# Patient Record
Sex: FEMALE | Employment: OTHER | URBAN - METROPOLITAN AREA
[De-identification: names, ages, dates, MRNs, and addresses within clinical notes are randomized per-mention and may not be internally consistent; named-entity substitution may affect disease eponyms.]

---

## 2023-06-16 LAB
ALANINE AMINOTRANSFERASE (SGPT) (U/L) IN SER/PLAS: 16 U/L (ref 7–45)
ANTI-DNA (DS): 1 IU/ML
ASPARTATE AMINOTRANSFERASE (SGOT) (U/L) IN SER/PLAS: 24 U/L (ref 9–39)
BASOPHILS (10*3/UL) IN BLOOD BY AUTOMATED COUNT: 0.02 X10E9/L (ref 0–0.1)
BASOPHILS/100 LEUKOCYTES IN BLOOD BY AUTOMATED COUNT: 0.4 % (ref 0–2)
C REACTIVE PROTEIN (MG/L) IN SER/PLAS: 0.26 MG/DL
COMPLEMENT C3 (MG/DL) IN SER/PLAS: 136 MG/DL (ref 87–200)
COMPLEMENT C4 (MG/DL) IN SER/PLAS: 37 MG/DL (ref 10–50)
CREATININE (MG/DL) IN SER/PLAS: 0.85 MG/DL (ref 0.5–1.05)
EOSINOPHILS (10*3/UL) IN BLOOD BY AUTOMATED COUNT: 0.09 X10E9/L (ref 0–0.4)
EOSINOPHILS/100 LEUKOCYTES IN BLOOD BY AUTOMATED COUNT: 1.8 % (ref 0–6)
ERYTHROCYTE DISTRIBUTION WIDTH (RATIO) BY AUTOMATED COUNT: 12.6 % (ref 11.5–14.5)
ERYTHROCYTE MEAN CORPUSCULAR HEMOGLOBIN CONCENTRATION (G/DL) BY AUTOMATED: 31.4 G/DL (ref 32–36)
ERYTHROCYTE MEAN CORPUSCULAR VOLUME (FL) BY AUTOMATED COUNT: 94 FL (ref 80–100)
ERYTHROCYTES (10*6/UL) IN BLOOD BY AUTOMATED COUNT: 4.63 X10E12/L (ref 4–5.2)
GFR FEMALE: 69 ML/MIN/1.73M2
HEMATOCRIT (%) IN BLOOD BY AUTOMATED COUNT: 43.6 % (ref 36–46)
HEMOGLOBIN (G/DL) IN BLOOD: 13.7 G/DL (ref 12–16)
IMMATURE GRANULOCYTES/100 LEUKOCYTES IN BLOOD BY AUTOMATED COUNT: 0.4 % (ref 0–0.9)
LEUKOCYTES (10*3/UL) IN BLOOD BY AUTOMATED COUNT: 5.1 X10E9/L (ref 4.4–11.3)
LYMPHOCYTES (10*3/UL) IN BLOOD BY AUTOMATED COUNT: 0.86 X10E9/L (ref 0.8–3)
LYMPHOCYTES/100 LEUKOCYTES IN BLOOD BY AUTOMATED COUNT: 16.9 % (ref 13–44)
MONOCYTES (10*3/UL) IN BLOOD BY AUTOMATED COUNT: 0.35 X10E9/L (ref 0.05–0.8)
MONOCYTES/100 LEUKOCYTES IN BLOOD BY AUTOMATED COUNT: 6.9 % (ref 2–10)
NEUTROPHILS (10*3/UL) IN BLOOD BY AUTOMATED COUNT: 3.74 X10E9/L (ref 1.6–5.5)
NEUTROPHILS/100 LEUKOCYTES IN BLOOD BY AUTOMATED COUNT: 73.6 % (ref 40–80)
NRBC (PER 100 WBCS) BY AUTOMATED COUNT: 0 /100 WBC (ref 0–0)
PLATELETS (10*3/UL) IN BLOOD AUTOMATED COUNT: 223 X10E9/L (ref 150–450)
SEDIMENTATION RATE, ERYTHROCYTE: 3 MM/H (ref 0–30)
UREA NITROGEN (MG/DL) IN SER/PLAS: 15 MG/DL (ref 6–23)

## 2023-12-15 ENCOUNTER — LAB (OUTPATIENT)
Dept: LAB | Facility: LAB | Age: 83
End: 2023-12-15
Payer: MEDICARE

## 2023-12-15 ENCOUNTER — OFFICE VISIT (OUTPATIENT)
Dept: RHEUMATOLOGY | Facility: CLINIC | Age: 83
End: 2023-12-15
Payer: MEDICARE

## 2023-12-15 VITALS
HEIGHT: 63 IN | TEMPERATURE: 97 F | WEIGHT: 145.8 LBS | BODY MASS INDEX: 25.83 KG/M2 | SYSTOLIC BLOOD PRESSURE: 152 MMHG | HEART RATE: 86 BPM | DIASTOLIC BLOOD PRESSURE: 86 MMHG

## 2023-12-15 DIAGNOSIS — Z79.899 ENCOUNTER FOR MONITORING OF HYDROXYCHLOROQUINE THERAPY: ICD-10-CM

## 2023-12-15 DIAGNOSIS — Z51.81 ENCOUNTER FOR MONITORING OF HYDROXYCHLOROQUINE THERAPY: ICD-10-CM

## 2023-12-15 DIAGNOSIS — M32.9 SYSTEMIC LUPUS ERYTHEMATOSUS, UNSPECIFIED SLE TYPE, UNSPECIFIED ORGAN INVOLVEMENT STATUS (MULTI): ICD-10-CM

## 2023-12-15 DIAGNOSIS — M32.9 SYSTEMIC LUPUS ERYTHEMATOSUS, UNSPECIFIED SLE TYPE, UNSPECIFIED ORGAN INVOLVEMENT STATUS (MULTI): Primary | ICD-10-CM

## 2023-12-15 PROBLEM — I10 HYPERTENSION: Status: ACTIVE | Noted: 2023-12-15

## 2023-12-15 PROBLEM — H40.003 GLAUCOMA SUSPECT OF BOTH EYES: Status: ACTIVE | Noted: 2023-12-15

## 2023-12-15 PROCEDURE — 3077F SYST BP >= 140 MM HG: CPT | Performed by: INTERNAL MEDICINE

## 2023-12-15 PROCEDURE — 99214 OFFICE O/P EST MOD 30 MIN: CPT | Performed by: INTERNAL MEDICINE

## 2023-12-15 PROCEDURE — 1126F AMNT PAIN NOTED NONE PRSNT: CPT | Performed by: INTERNAL MEDICINE

## 2023-12-15 PROCEDURE — 3079F DIAST BP 80-89 MM HG: CPT | Performed by: INTERNAL MEDICINE

## 2023-12-15 PROCEDURE — 86140 C-REACTIVE PROTEIN: CPT

## 2023-12-15 PROCEDURE — 86160 COMPLEMENT ANTIGEN: CPT

## 2023-12-15 PROCEDURE — 80053 COMPREHEN METABOLIC PANEL: CPT

## 2023-12-15 PROCEDURE — 86225 DNA ANTIBODY NATIVE: CPT

## 2023-12-15 PROCEDURE — 85652 RBC SED RATE AUTOMATED: CPT

## 2023-12-15 PROCEDURE — 1036F TOBACCO NON-USER: CPT | Performed by: INTERNAL MEDICINE

## 2023-12-15 PROCEDURE — 1160F RVW MEDS BY RX/DR IN RCRD: CPT | Performed by: INTERNAL MEDICINE

## 2023-12-15 PROCEDURE — 36415 COLL VENOUS BLD VENIPUNCTURE: CPT

## 2023-12-15 PROCEDURE — 1159F MED LIST DOCD IN RCRD: CPT | Performed by: INTERNAL MEDICINE

## 2023-12-15 PROCEDURE — 85025 COMPLETE CBC W/AUTO DIFF WBC: CPT

## 2023-12-15 RX ORDER — CHOLECALCIFEROL (VITAMIN D3) 25 MCG
1 TABLET ORAL DAILY
COMMUNITY
Start: 2017-12-06

## 2023-12-15 RX ORDER — LATANOPROST 50 UG/ML
SOLUTION/ DROPS OPHTHALMIC
COMMUNITY
Start: 2014-03-25

## 2023-12-15 RX ORDER — HYDROCHLOROTHIAZIDE 25 MG/1
0.5 TABLET ORAL DAILY
COMMUNITY

## 2023-12-15 RX ORDER — CYCLOSPORINE 0.5 MG/ML
1 EMULSION OPHTHALMIC EVERY 12 HOURS
COMMUNITY

## 2023-12-15 RX ORDER — HYDROXYCHLOROQUINE SULFATE 200 MG/1
1 TABLET, FILM COATED ORAL DAILY
COMMUNITY
Start: 2015-03-12

## 2023-12-15 RX ORDER — MULTIVIT-MIN/FA/LYCOPEN/LUTEIN .4-300-25
1 TABLET ORAL DAILY
COMMUNITY

## 2023-12-15 RX ORDER — ATENOLOL 50 MG/1
1 TABLET ORAL DAILY
COMMUNITY

## 2023-12-15 RX ORDER — CALCIUM CITRATE/VITAMIN D3 200MG-6.25
TABLET ORAL
COMMUNITY
Start: 2017-12-06

## 2023-12-15 ASSESSMENT — ENCOUNTER SYMPTOMS
FEVER: 0
FATIGUE: 0
NUMBNESS: 0
SHORTNESS OF BREATH: 0
ARTHRALGIAS: 1
BACK PAIN: 0
JOINT SWELLING: 0
MYALGIAS: 0
COLOR CHANGE: 0
COUGH: 0
WEAKNESS: 0

## 2023-12-15 ASSESSMENT — PATIENT HEALTH QUESTIONNAIRE - PHQ9
1. LITTLE INTEREST OR PLEASURE IN DOING THINGS: NOT AT ALL
2. FEELING DOWN, DEPRESSED OR HOPELESS: NOT AT ALL
SUM OF ALL RESPONSES TO PHQ9 QUESTIONS 1 AND 2: 0

## 2023-12-15 NOTE — LETTER
December 15, 2023     Laina Hurley MD  [address]  [address]  [facility] [address]    Patient: Marline Cardoza   YOB: 1940   Date of Visit: 12/15/2023       Dear Dr. Laina Hurley MD:    Thank you for referring Marline Cardoza to me for evaluation. Below are my notes for this consultation.  If you have questions, please do not hesitate to call me. I look forward to following your patient along with you.       Sincerely,     Siena Roberts MD      CC: No Recipients  ______________________________________________________________________________________    Subjective   Patient ID: Marline Cardoza is a 83 y.o. female who presents for Lupus.  Lupus  This is a chronic problem. Associated symptoms include arthralgias. Pertinent negatives include no coughing, fatigue, fever, joint swelling, myalgias, numbness, rash or weakness. Associated symptoms comments: No new symptoms   Feels well.  No new rashes   Has had some moles removed by dermatology.  Is doing more at home ( now completely blind) but it is not affecting her physically.  .     Patient Active Problem List    Diagnosis Date Noted   • Glaucoma suspect of both eyes 12/15/2023   • Hypertension 12/15/2023   • Systemic lupus erythematosus (CMS/HCC) 12/15/2023   • Encounter for monitoring of hydroxychloroquine therapy 12/15/2023     Past Medical History:   Diagnosis Date   • Episcleritis 03/30/2016    History of episcleritis   • Skin cancer 01/21/2020    History of Mohs micrographic surgery for skin cancer   • Unspecified cataract     Cataracts, bilateral   • Unspecified open wound of right cheek and temporomandibular area, initial encounter 01/21/2020    Open wound of right cheek     Current Outpatient Medications   Medication Instructions   • atenolol (Tenormin) 50 mg tablet 1 tablet, oral, Daily   • ten cit-mag-D3-Zn--man-bor (Citracal-D3 Plus Magnesium) 250- mg-mg-unit tablet oral   • cholecalciferol (Vitamin D-3) 25 MCG  "(1000 UT) tablet 1 tablet, oral, Daily   • hydroCHLOROthiazide (HYDRODiuril) 25 mg tablet 0.5 tablets, oral, Daily   • hydroxychloroquine (Plaquenil) 200 mg tablet 1 tablet, oral, Daily   • latanoprost (Xalatan) 0.005 % ophthalmic solution ophthalmic (eye)   • multivitamin with minerals iron-free (Centrum Silver) 1 tablet, oral, Daily   • Restasis 0.05 % ophthalmic emulsion 1 drop, Both Eyes, Every 12 hours       No Known Allergies    Review of Systems   Constitutional:  Negative for fatigue and fever.   Respiratory:  Negative for cough and shortness of breath.    Cardiovascular:  Negative for leg swelling.   Musculoskeletal:  Positive for arthralgias. Negative for back pain, gait problem, joint swelling and myalgias.   Skin:  Negative for color change and rash.   Neurological:  Negative for weakness and numbness.       Objective  /86   Pulse 86   Temp 36.1 °C (97 °F)   Ht 1.6 m (5' 3\")   Wt 66.1 kg (145 lb 12.8 oz)   BMI 25.83 kg/m²       Physical Exam  Vitals reviewed.   Constitutional:       General: She is not in acute distress.     Appearance: Normal appearance.   HENT:      Head: Normocephalic and atraumatic.   Eyes:      Conjunctiva/sclera: Conjunctivae normal.   Pulmonary:      Effort: Pulmonary effort is normal. No respiratory distress.   Musculoskeletal:         General: No swelling, tenderness or deformity.      Cervical back: Normal range of motion.      Right lower leg: No edema.      Left lower leg: No edema.      Comments: No synovitis of examined joints   Skin:     Coloration: Skin is not pale.      Findings: No erythema or rash.      Comments: Multiple moles   Neurological:      General: No focal deficit present.      Mental Status: She is alert and oriented to person, place, and time. Mental status is at baseline.      Gait: Gait normal.   Psychiatric:         Mood and Affect: Mood normal.         Assessment/Plan   Problem List Items Addressed This Visit             ICD-10-CM    Systemic " lupus erythematosus (CMS/HCC) - Primary M32.9    Relevant Orders    Anti-DNA Antibody, Double-Stranded    C3 Complement    C4 Complement    CBC and Auto Differential    Comprehensive Metabolic Panel    C-Reactive Protein    Sedimentation Rate    Encounter for monitoring of hydroxychloroquine therapy Z51.81, Z79.899     You are on chronic plaquenil.     Make sure you see your eye doctor yearly.  If you need an eye doctor, let me know and I can place a referral    Plaquenil is dosed based on your weight.   We will make sure your dose is below the maximum daily dose of 5mg/kg            Follow up 6 mo       Siena Roberts MD 12/15/23 1:33 PM

## 2023-12-15 NOTE — ASSESSMENT & PLAN NOTE
Lupus on plaquenil therapy.  Pt has been stable on treatment for years.  Is up to date on plaquenil screening exams.  Labs ordered today to monitor for increased disease activity, end organ manifestations and to monitor for any drug toxicities due to chronic medications

## 2023-12-15 NOTE — PROGRESS NOTES
Subjective   Patient ID: Marline Cardoza is a 83 y.o. female who presents for Lupus.  Lupus  This is a chronic problem. Associated symptoms include arthralgias. Pertinent negatives include no coughing, fatigue, fever, joint swelling, myalgias, numbness, rash or weakness. Associated symptoms comments: No new symptoms   Feels well.  No new rashes   Has had some moles removed by dermatology.  Is doing more at home ( now completely blind) but it is not affecting her physically.  .     Patient Active Problem List    Diagnosis Date Noted    Glaucoma suspect of both eyes 12/15/2023    Hypertension 12/15/2023    Systemic lupus erythematosus (CMS/HCC) 12/15/2023    Encounter for monitoring of hydroxychloroquine therapy 12/15/2023     Past Medical History:   Diagnosis Date    Episcleritis 03/30/2016    History of episcleritis    Skin cancer 01/21/2020    History of Mohs micrographic surgery for skin cancer    Unspecified cataract     Cataracts, bilateral    Unspecified open wound of right cheek and temporomandibular area, initial encounter 01/21/2020    Open wound of right cheek     Current Outpatient Medications   Medication Instructions    atenolol (Tenormin) 50 mg tablet 1 tablet, oral, Daily    ten cit-mag-D3-Zn--man-bor (Citracal-D3 Plus Magnesium) 250- mg-mg-unit tablet oral    cholecalciferol (Vitamin D-3) 25 MCG (1000 UT) tablet 1 tablet, oral, Daily    hydroCHLOROthiazide (HYDRODiuril) 25 mg tablet 0.5 tablets, oral, Daily    hydroxychloroquine (Plaquenil) 200 mg tablet 1 tablet, oral, Daily    latanoprost (Xalatan) 0.005 % ophthalmic solution ophthalmic (eye)    multivitamin with minerals iron-free (Centrum Silver) 1 tablet, oral, Daily    Restasis 0.05 % ophthalmic emulsion 1 drop, Both Eyes, Every 12 hours       No Known Allergies    Review of Systems   Constitutional:  Negative for fatigue and fever.   Respiratory:  Negative for cough and shortness of breath.    Cardiovascular:  Negative for leg  "swelling.   Musculoskeletal:  Positive for arthralgias. Negative for back pain, gait problem, joint swelling and myalgias.   Skin:  Negative for color change and rash.   Neurological:  Negative for weakness and numbness.       Objective  /86   Pulse 86   Temp 36.1 °C (97 °F)   Ht 1.6 m (5' 3\")   Wt 66.1 kg (145 lb 12.8 oz)   BMI 25.83 kg/m²       Physical Exam  Vitals reviewed.   Constitutional:       General: She is not in acute distress.     Appearance: Normal appearance.   HENT:      Head: Normocephalic and atraumatic.   Eyes:      Conjunctiva/sclera: Conjunctivae normal.   Pulmonary:      Effort: Pulmonary effort is normal. No respiratory distress.   Musculoskeletal:         General: No swelling, tenderness or deformity.      Cervical back: Normal range of motion.      Right lower leg: No edema.      Left lower leg: No edema.      Comments: No synovitis of examined joints   Skin:     Coloration: Skin is not pale.      Findings: No erythema or rash.      Comments: Multiple moles   Neurological:      General: No focal deficit present.      Mental Status: She is alert and oriented to person, place, and time. Mental status is at baseline.      Gait: Gait normal.   Psychiatric:         Mood and Affect: Mood normal.         Assessment/Plan   Problem List Items Addressed This Visit             ICD-10-CM    Systemic lupus erythematosus (CMS/HCC) - Primary M32.9    Relevant Orders    Anti-DNA Antibody, Double-Stranded    C3 Complement    C4 Complement    CBC and Auto Differential    Comprehensive Metabolic Panel    C-Reactive Protein    Sedimentation Rate    Encounter for monitoring of hydroxychloroquine therapy Z51.81, Z79.899     You are on chronic plaquenil.     Make sure you see your eye doctor yearly.  If you need an eye doctor, let me know and I can place a referral    Plaquenil is dosed based on your weight.   We will make sure your dose is below the maximum daily dose of 5mg/kg            Follow up 6 " santana Roberts MD 12/15/23 1:33 PM    Detail Level: Detailed Detail Level: Simple

## 2023-12-15 NOTE — PATIENT INSTRUCTIONS
It was a pleasure to see you today  Please call if your symptoms worsen  Please review your summary for education and reminders.  Follow up at your next appointment.    If you had labs/xrays done today, you will be able to view on [facility].   We will contact you when the results are reviewed for further discussion.  Please note that you may receive your results before I have had a chance to review.  Please know I will be contacting you for discussion  Homegoing instructions for all patient  A healthy lifestyle helps chronic diseases  These are all the goals you should strive to improve your overall health   Blood pressure <130/85   BMI of <30 or waist circumference that is 1/2 of your height   Fasting blood sugar <107 (if you are diabetic, aim for an A1c <6.4%_   LDL cholesterol <130   Avoid smoking   Manage your stress   Get your preventive exams   Get your immunizations

## 2023-12-16 LAB
ALBUMIN SERPL BCP-MCNC: 4.3 G/DL (ref 3.4–5)
ALP SERPL-CCNC: 60 U/L (ref 33–136)
ALT SERPL W P-5'-P-CCNC: 16 U/L (ref 7–45)
ANION GAP SERPL CALC-SCNC: 16 MMOL/L (ref 10–20)
AST SERPL W P-5'-P-CCNC: 22 U/L (ref 9–39)
BASOPHILS # BLD AUTO: 0.04 X10*3/UL (ref 0–0.1)
BASOPHILS NFR BLD AUTO: 0.7 %
BILIRUB SERPL-MCNC: 0.4 MG/DL (ref 0–1.2)
BUN SERPL-MCNC: 16 MG/DL (ref 6–23)
C3 SERPL-MCNC: 151 MG/DL (ref 87–200)
C4 SERPL-MCNC: 42 MG/DL (ref 10–50)
CALCIUM SERPL-MCNC: 8.9 MG/DL (ref 8.6–10.6)
CHLORIDE SERPL-SCNC: 101 MMOL/L (ref 98–107)
CO2 SERPL-SCNC: 27 MMOL/L (ref 21–32)
CREAT SERPL-MCNC: 0.84 MG/DL (ref 0.5–1.05)
CRP SERPL-MCNC: 0.64 MG/DL
DSDNA AB SER-ACNC: 1 IU/ML
EOSINOPHIL # BLD AUTO: 0.17 X10*3/UL (ref 0–0.4)
EOSINOPHIL NFR BLD AUTO: 2.9 %
ERYTHROCYTE [DISTWIDTH] IN BLOOD BY AUTOMATED COUNT: 12.9 % (ref 11.5–14.5)
ERYTHROCYTE [SEDIMENTATION RATE] IN BLOOD BY WESTERGREN METHOD: 7 MM/H (ref 0–30)
GFR SERPL CREATININE-BSD FRML MDRD: 70 ML/MIN/1.73M*2
GLUCOSE SERPL-MCNC: 121 MG/DL (ref 74–99)
HCT VFR BLD AUTO: 41.3 % (ref 36–46)
HGB BLD-MCNC: 13.1 G/DL (ref 12–16)
IMM GRANULOCYTES # BLD AUTO: 0.04 X10*3/UL (ref 0–0.5)
IMM GRANULOCYTES NFR BLD AUTO: 0.7 % (ref 0–0.9)
LYMPHOCYTES # BLD AUTO: 0.75 X10*3/UL (ref 0.8–3)
LYMPHOCYTES NFR BLD AUTO: 13 %
MCH RBC QN AUTO: 30.8 PG (ref 26–34)
MCHC RBC AUTO-ENTMCNC: 31.7 G/DL (ref 32–36)
MCV RBC AUTO: 97 FL (ref 80–100)
MONOCYTES # BLD AUTO: 0.42 X10*3/UL (ref 0.05–0.8)
MONOCYTES NFR BLD AUTO: 7.3 %
NEUTROPHILS # BLD AUTO: 4.37 X10*3/UL (ref 1.6–5.5)
NEUTROPHILS NFR BLD AUTO: 75.4 %
NRBC BLD-RTO: 0 /100 WBCS (ref 0–0)
PLATELET # BLD AUTO: 235 X10*3/UL (ref 150–450)
POTASSIUM SERPL-SCNC: 4.1 MMOL/L (ref 3.5–5.3)
PROT SERPL-MCNC: 6.5 G/DL (ref 6.4–8.2)
RBC # BLD AUTO: 4.25 X10*6/UL (ref 4–5.2)
SODIUM SERPL-SCNC: 140 MMOL/L (ref 136–145)
WBC # BLD AUTO: 5.8 X10*3/UL (ref 4.4–11.3)

## 2024-06-14 ENCOUNTER — LAB (OUTPATIENT)
Dept: LAB | Facility: LAB | Age: 84
End: 2024-06-14
Payer: MEDICARE

## 2024-06-14 ENCOUNTER — APPOINTMENT (OUTPATIENT)
Dept: RHEUMATOLOGY | Facility: CLINIC | Age: 84
End: 2024-06-14
Payer: MEDICARE

## 2024-06-14 VITALS
HEART RATE: 85 BPM | BODY MASS INDEX: 25.55 KG/M2 | TEMPERATURE: 96.8 F | WEIGHT: 144.2 LBS | HEIGHT: 63 IN | OXYGEN SATURATION: 98 % | SYSTOLIC BLOOD PRESSURE: 145 MMHG | DIASTOLIC BLOOD PRESSURE: 69 MMHG

## 2024-06-14 DIAGNOSIS — Z51.81 ENCOUNTER FOR MONITORING OF HYDROXYCHLOROQUINE THERAPY: ICD-10-CM

## 2024-06-14 DIAGNOSIS — Z79.899 ENCOUNTER FOR MONITORING OF HYDROXYCHLOROQUINE THERAPY: ICD-10-CM

## 2024-06-14 DIAGNOSIS — M32.9 SYSTEMIC LUPUS ERYTHEMATOSUS, UNSPECIFIED SLE TYPE, UNSPECIFIED ORGAN INVOLVEMENT STATUS (MULTI): Primary | ICD-10-CM

## 2024-06-14 DIAGNOSIS — M32.9 SYSTEMIC LUPUS ERYTHEMATOSUS, UNSPECIFIED SLE TYPE, UNSPECIFIED ORGAN INVOLVEMENT STATUS (MULTI): ICD-10-CM

## 2024-06-14 PROCEDURE — 3077F SYST BP >= 140 MM HG: CPT | Performed by: INTERNAL MEDICINE

## 2024-06-14 PROCEDURE — 85652 RBC SED RATE AUTOMATED: CPT

## 2024-06-14 PROCEDURE — 86140 C-REACTIVE PROTEIN: CPT

## 2024-06-14 PROCEDURE — 1123F ACP DISCUSS/DSCN MKR DOCD: CPT | Performed by: INTERNAL MEDICINE

## 2024-06-14 PROCEDURE — 86160 COMPLEMENT ANTIGEN: CPT

## 2024-06-14 PROCEDURE — 85025 COMPLETE CBC W/AUTO DIFF WBC: CPT

## 2024-06-14 PROCEDURE — 36415 COLL VENOUS BLD VENIPUNCTURE: CPT

## 2024-06-14 PROCEDURE — 1159F MED LIST DOCD IN RCRD: CPT | Performed by: INTERNAL MEDICINE

## 2024-06-14 PROCEDURE — 1158F ADVNC CARE PLAN TLK DOCD: CPT | Performed by: INTERNAL MEDICINE

## 2024-06-14 PROCEDURE — 3078F DIAST BP <80 MM HG: CPT | Performed by: INTERNAL MEDICINE

## 2024-06-14 PROCEDURE — 86225 DNA ANTIBODY NATIVE: CPT

## 2024-06-14 PROCEDURE — 1160F RVW MEDS BY RX/DR IN RCRD: CPT | Performed by: INTERNAL MEDICINE

## 2024-06-14 PROCEDURE — 99214 OFFICE O/P EST MOD 30 MIN: CPT | Performed by: INTERNAL MEDICINE

## 2024-06-14 PROCEDURE — 80053 COMPREHEN METABOLIC PANEL: CPT

## 2024-06-14 PROCEDURE — 1036F TOBACCO NON-USER: CPT | Performed by: INTERNAL MEDICINE

## 2024-06-14 ASSESSMENT — ENCOUNTER SYMPTOMS
JOINT SWELLING: 0
FATIGUE: 0
WEAKNESS: 0
COUGH: 0
COLOR CHANGE: 0
FEVER: 0
SHORTNESS OF BREATH: 0
NUMBNESS: 0
BACK PAIN: 0
MYALGIAS: 0
ARTHRALGIAS: 1

## 2024-06-14 ASSESSMENT — PATIENT HEALTH QUESTIONNAIRE - PHQ9
1. LITTLE INTEREST OR PLEASURE IN DOING THINGS: NOT AT ALL
SUM OF ALL RESPONSES TO PHQ9 QUESTIONS 1 AND 2: 0
2. FEELING DOWN, DEPRESSED OR HOPELESS: NOT AT ALL

## 2024-06-14 NOTE — PROGRESS NOTES
[facility] RHEUMATOLOGY     AND INTERNAL MEDICINE    RHEUMATOLOGY PROGRESS NOTE  Marline Cardoza 83 y.o. female  Chief Complaint   Patient presents with    Lupus       SUBJECTIVE  Pt reports she is doing well.   Trying to keep cool with the hotter temperatures outside.   No worsening joint pain.   +rashes but unchanged  No new medical issues      Records since last seen reviewed in [facility], [facility] and [facility] Record  Patient Active Problem List    Diagnosis Date Noted    Glaucoma suspect of both eyes 12/15/2023    Hypertension 12/15/2023    Systemic lupus erythematosus (Multi) 12/15/2023    Encounter for monitoring of hydroxychloroquine therapy 12/15/2023     Past Medical History:   Diagnosis Date    Episcleritis 03/30/2016    History of episcleritis    Skin cancer 01/21/2020    History of Mohs micrographic surgery for skin cancer    Unspecified cataract     Cataracts, bilateral    Unspecified open wound of right cheek and temporomandibular area, initial encounter 01/21/2020    Open wound of right cheek     Current Outpatient Medications   Medication Instructions    atenolol (Tenormin) 50 mg tablet 1 tablet, oral, Daily    ten cit-mag-D3-Zn--man-bor (Citracal-D3 Plus Magnesium) 250- mg-mg-unit tablet oral    cholecalciferol (Vitamin D-3) 25 MCG (1000 UT) tablet 1 tablet, oral, Daily    hydroCHLOROthiazide (HYDRODiuril) 25 mg tablet 0.5 tablets, oral, Daily    hydroxychloroquine (Plaquenil) 200 mg tablet 1 tablet, oral, Daily    latanoprost (Xalatan) 0.005 % ophthalmic solution ophthalmic (eye)    multivitamin with minerals iron-free (Centrum Silver) 1 tablet, oral, Daily    Restasis 0.05 % ophthalmic emulsion 1 drop, Both Eyes, Every 12 hours     No Known Allergies  Review of Systems   Constitutional:  Negative for fatigue and fever.   Respiratory:  Negative for cough and shortness of breath.    Cardiovascular:  Negative for leg swelling.  "  Musculoskeletal:  Positive for arthralgias. Negative for back pain, gait problem, joint swelling and myalgias.   Skin:  Positive for rash. Negative for color change.   Neurological:  Negative for weakness and numbness.     Social History     Tobacco Use    Smoking status: Never    Smokeless tobacco: Never   Substance Use Topics    Alcohol use: Yes     PHYSICAL EXAM  /69   Pulse 85   Temp 36 °C (96.8 °F)   Ht 1.6 m (5' 3\")   Wt 65.4 kg (144 lb 3.2 oz)   SpO2 98%   BMI 25.54 kg/m²   Physical Exam  Vitals reviewed.   Constitutional:       General: She is not in acute distress.     Appearance: Normal appearance.   HENT:      Head: Normocephalic and atraumatic.   Eyes:      General: No scleral icterus.     Extraocular Movements: Extraocular movements intact.      Conjunctiva/sclera: Conjunctivae normal.      Pupils: Pupils are equal, round, and reactive to light.   Cardiovascular:      Rate and Rhythm: Normal rate and regular rhythm.      Pulses: Normal pulses.      Heart sounds: Normal heart sounds. No murmur heard.     No friction rub. No gallop.   Pulmonary:      Effort: Pulmonary effort is normal. No respiratory distress.      Breath sounds: Normal breath sounds.   Musculoskeletal:         General: Deformity present. No swelling or tenderness.      Cervical back: Normal range of motion and neck supple. No tenderness.      Right lower leg: No edema.      Left lower leg: No edema.      Comments: No synovitis of examined joints  Angulation deformities of hands   Skin:     Coloration: Skin is not pale.      Findings: Rash (on nose and patches on arms) present. No erythema.      Comments: Multiple moles   Neurological:      General: No focal deficit present.      Mental Status: She is alert and oriented to person, place, and time. Mental status is at baseline.      Gait: Gait normal.   Psychiatric:         Mood and Affect: Mood normal.       Health Maintenance Due   Topic Date Due    Lipid Panel  Never done "    Medicare Annual Wellness Visit (AWV)  Never done    Bone Density Scan  Never done    Diabetes Screening  Never done    RSV Pregnant patients and/or  patients aged 60+ years (1 - 1-dose 60+ series) Never done    COVID-19 Vaccine (5 - 2023-24 season) 09/01/2023     Assessment/plan  Problem List Items Addressed This Visit       Systemic lupus erythematosus (Multi) - Primary    Current Assessment & Plan     Doing well on plaquenil therapy.  Pt to continue meds.   No signs of disease progression on exam Labs ordered today to monitor for increased disease activity, end organ manifestations and to monitor for any drug toxicities due to chronic medications           Relevant Orders    Anti-DNA Antibody, Double-Stranded    C3 Complement    C4 Complement    CBC and Auto Differential    Comprehensive Metabolic Panel    C-Reactive Protein    Sedimentation Rate    Encounter for monitoring of hydroxychloroquine therapy    Overview     Ophtho 1/31/24 ok         Current Assessment & Plan     You are on chronic plaquenil.     Make sure you see your eye doctor yearly --last done January  Plaquenil is dosed based on your weight.   We will make sure your dose is below the maximum daily dose of 5mg/kg            Follow up: ___6__months

## 2024-06-14 NOTE — ASSESSMENT & PLAN NOTE
Doing well on plaquenil therapy.  Pt to continue meds.   No signs of disease progression on exam Labs ordered today to monitor for increased disease activity, end organ manifestations and to monitor for any drug toxicities due to chronic medications

## 2024-06-14 NOTE — ASSESSMENT & PLAN NOTE
You are on chronic plaquenil.     Make sure you see your eye doctor yearly --last done January  Plaquenil is dosed based on your weight.   We will make sure your dose is below the maximum daily dose of 5mg/kg

## 2024-06-14 NOTE — LETTER
June 14, 2024     Laina Hurley MD  [address]  [address]  [facility] [address]    Patient: Marline Cardoza   YOB: 1940   Date of Visit: 6/14/2024       Dear Dr. Laina Hurley MD:    Thank you for referring Marline Cardoza to me for evaluation. Below are my notes for this visit.  If you have questions, please do not hesitate to call me. I look forward to following your patient along with you.       Sincerely,     Siena Roberts MD      CC: No Recipients  ______________________________________________________________________________________                                                    [facility] RHEUMATOLOGY     AND INTERNAL MEDICINE    RHEUMATOLOGY PROGRESS NOTE  Marline Cardoza 83 y.o. female  Chief Complaint   Patient presents with   • Lupus       SUBJECTIVE  Pt reports she is doing well.   Trying to keep cool with the hotter temperatures outside.   No worsening joint pain.   +rashes but unchanged  No new medical issues      Records since last seen reviewed in [facility], [facility] and [facility] Record  Patient Active Problem List    Diagnosis Date Noted   • Glaucoma suspect of both eyes 12/15/2023   • Hypertension 12/15/2023   • Systemic lupus erythematosus (Multi) 12/15/2023   • Encounter for monitoring of hydroxychloroquine therapy 12/15/2023     Past Medical History:   Diagnosis Date   • Episcleritis 03/30/2016    History of episcleritis   • Skin cancer 01/21/2020    History of Mohs micrographic surgery for skin cancer   • Unspecified cataract     Cataracts, bilateral   • Unspecified open wound of right cheek and temporomandibular area, initial encounter 01/21/2020    Open wound of right cheek     Current Outpatient Medications   Medication Instructions   • atenolol (Tenormin) 50 mg tablet 1 tablet, oral, Daily   • ten cit-mag-D3-Zn--man-bor (Citracal-D3 Plus Magnesium) 250- mg-mg-unit tablet oral   • cholecalciferol (Vitamin D-3) 25 MCG (1000 UT) tablet 1  "tablet, oral, Daily   • hydroCHLOROthiazide (HYDRODiuril) 25 mg tablet 0.5 tablets, oral, Daily   • hydroxychloroquine (Plaquenil) 200 mg tablet 1 tablet, oral, Daily   • latanoprost (Xalatan) 0.005 % ophthalmic solution ophthalmic (eye)   • multivitamin with minerals iron-free (Centrum Silver) 1 tablet, oral, Daily   • Restasis 0.05 % ophthalmic emulsion 1 drop, Both Eyes, Every 12 hours     No Known Allergies  Review of Systems   Constitutional:  Negative for fatigue and fever.   Respiratory:  Negative for cough and shortness of breath.    Cardiovascular:  Negative for leg swelling.   Musculoskeletal:  Positive for arthralgias. Negative for back pain, gait problem, joint swelling and myalgias.   Skin:  Positive for rash. Negative for color change.   Neurological:  Negative for weakness and numbness.     Social History     Tobacco Use   • Smoking status: Never   • Smokeless tobacco: Never   Substance Use Topics   • Alcohol use: Yes     PHYSICAL EXAM  /69   Pulse 85   Temp 36 °C (96.8 °F)   Ht 1.6 m (5' 3\")   Wt 65.4 kg (144 lb 3.2 oz)   SpO2 98%   BMI 25.54 kg/m²   Physical Exam  Vitals reviewed.   Constitutional:       General: She is not in acute distress.     Appearance: Normal appearance.   HENT:      Head: Normocephalic and atraumatic.   Eyes:      General: No scleral icterus.     Extraocular Movements: Extraocular movements intact.      Conjunctiva/sclera: Conjunctivae normal.      Pupils: Pupils are equal, round, and reactive to light.   Cardiovascular:      Rate and Rhythm: Normal rate and regular rhythm.      Pulses: Normal pulses.      Heart sounds: Normal heart sounds. No murmur heard.     No friction rub. No gallop.   Pulmonary:      Effort: Pulmonary effort is normal. No respiratory distress.      Breath sounds: Normal breath sounds.   Musculoskeletal:         General: No swelling, tenderness or deformity.      Cervical back: Normal range of motion and neck supple. No tenderness.      Right " lower leg: No edema.      Left lower leg: No edema.      Comments: No synovitis of examined joints   Skin:     Coloration: Skin is not pale.      Findings: Rash (on nose and patches on arms) present. No erythema.      Comments: Multiple moles   Neurological:      General: No focal deficit present.      Mental Status: She is alert and oriented to person, place, and time. Mental status is at baseline.      Gait: Gait normal.   Psychiatric:         Mood and Affect: Mood normal.       Health Maintenance Due   Topic Date Due   • Lipid Panel  Never done   • Medicare Annual Wellness Visit (AWV)  Never done   • Bone Density Scan  Never done   • Diabetes Screening  Never done   • RSV Pregnant patients and/or  patients aged 60+ years (1 - 1-dose 60+ series) Never done   • COVID-19 Vaccine (5 - 2023-24 season) 09/01/2023     Assessment/plan  Problem List Items Addressed This Visit       Systemic lupus erythematosus (Multi) - Primary    Current Assessment & Plan     Doing well on plaquenil therapy.  Pt to continue meds.   No signs of disease progression on exam Labs ordered today to monitor for increased disease activity, end organ manifestations and to monitor for any drug toxicities due to chronic medications           Relevant Orders    Anti-DNA Antibody, Double-Stranded    C3 Complement    C4 Complement    CBC and Auto Differential    Comprehensive Metabolic Panel    C-Reactive Protein    Sedimentation Rate    Encounter for monitoring of hydroxychloroquine therapy    Overview     Ophtho 1/31/24 ok         Current Assessment & Plan     You are on chronic plaquenil.     Make sure you see your eye doctor yearly --last done January  Plaquenil is dosed based on your weight.   We will make sure your dose is below the maximum daily dose of 5mg/kg            Follow up: ___6__months

## 2024-06-14 NOTE — PATIENT INSTRUCTIONS

## 2024-06-15 LAB
ALBUMIN SERPL BCP-MCNC: 4.3 G/DL (ref 3.4–5)
ALP SERPL-CCNC: 61 U/L (ref 33–136)
ALT SERPL W P-5'-P-CCNC: 16 U/L (ref 7–45)
ANION GAP SERPL CALC-SCNC: 16 MMOL/L (ref 10–20)
AST SERPL W P-5'-P-CCNC: 21 U/L (ref 9–39)
BASOPHILS # BLD AUTO: 0.04 X10*3/UL (ref 0–0.1)
BASOPHILS NFR BLD AUTO: 0.6 %
BILIRUB SERPL-MCNC: 0.5 MG/DL (ref 0–1.2)
BUN SERPL-MCNC: 19 MG/DL (ref 6–23)
C3 SERPL-MCNC: 143 MG/DL (ref 87–200)
C4 SERPL-MCNC: 39 MG/DL (ref 10–50)
CALCIUM SERPL-MCNC: 9.1 MG/DL (ref 8.6–10.6)
CHLORIDE SERPL-SCNC: 102 MMOL/L (ref 98–107)
CO2 SERPL-SCNC: 30 MMOL/L (ref 21–32)
CREAT SERPL-MCNC: 0.93 MG/DL (ref 0.5–1.05)
CRP SERPL-MCNC: 0.64 MG/DL
DSDNA AB SER-ACNC: 1 IU/ML
EGFRCR SERPLBLD CKD-EPI 2021: 62 ML/MIN/1.73M*2
EOSINOPHIL # BLD AUTO: 0.06 X10*3/UL (ref 0–0.4)
EOSINOPHIL NFR BLD AUTO: 0.9 %
ERYTHROCYTE [DISTWIDTH] IN BLOOD BY AUTOMATED COUNT: 12.9 % (ref 11.5–14.5)
ERYTHROCYTE [SEDIMENTATION RATE] IN BLOOD BY WESTERGREN METHOD: 13 MM/H (ref 0–30)
GLUCOSE SERPL-MCNC: 163 MG/DL (ref 74–99)
HCT VFR BLD AUTO: 40.4 % (ref 36–46)
HGB BLD-MCNC: 12.9 G/DL (ref 12–16)
IMM GRANULOCYTES # BLD AUTO: 0.03 X10*3/UL (ref 0–0.5)
IMM GRANULOCYTES NFR BLD AUTO: 0.5 % (ref 0–0.9)
LYMPHOCYTES # BLD AUTO: 0.76 X10*3/UL (ref 0.8–3)
LYMPHOCYTES NFR BLD AUTO: 11.6 %
MCH RBC QN AUTO: 30.3 PG (ref 26–34)
MCHC RBC AUTO-ENTMCNC: 31.9 G/DL (ref 32–36)
MCV RBC AUTO: 95 FL (ref 80–100)
MONOCYTES # BLD AUTO: 0.46 X10*3/UL (ref 0.05–0.8)
MONOCYTES NFR BLD AUTO: 7 %
NEUTROPHILS # BLD AUTO: 5.18 X10*3/UL (ref 1.6–5.5)
NEUTROPHILS NFR BLD AUTO: 79.4 %
NRBC BLD-RTO: 0 /100 WBCS (ref 0–0)
PLATELET # BLD AUTO: 220 X10*3/UL (ref 150–450)
POTASSIUM SERPL-SCNC: 4.6 MMOL/L (ref 3.5–5.3)
PROT SERPL-MCNC: 6.1 G/DL (ref 6.4–8.2)
RBC # BLD AUTO: 4.26 X10*6/UL (ref 4–5.2)
SODIUM SERPL-SCNC: 143 MMOL/L (ref 136–145)
WBC # BLD AUTO: 6.5 X10*3/UL (ref 4.4–11.3)

## 2024-07-17 DIAGNOSIS — M32.9 SYSTEMIC LUPUS ERYTHEMATOSUS, UNSPECIFIED SLE TYPE, UNSPECIFIED ORGAN INVOLVEMENT STATUS (MULTI): Primary | ICD-10-CM

## 2024-07-17 RX ORDER — HYDROXYCHLOROQUINE SULFATE 200 MG/1
TABLET, FILM COATED ORAL DAILY
Qty: 90 TABLET | Refills: 3 | Status: SHIPPED | OUTPATIENT
Start: 2024-07-17

## 2024-12-20 ENCOUNTER — APPOINTMENT (OUTPATIENT)
Dept: RHEUMATOLOGY | Facility: CLINIC | Age: 84
End: 2024-12-20
Payer: MEDICARE

## 2025-04-23 RX ORDER — METFORMIN HYDROCHLORIDE 500 MG/1
TABLET ORAL
COMMUNITY
Start: 2024-09-23

## 2025-04-24 ENCOUNTER — APPOINTMENT (OUTPATIENT)
Dept: RHEUMATOLOGY | Facility: CLINIC | Age: 85
End: 2025-04-24
Payer: MEDICARE

## 2025-04-24 VITALS
OXYGEN SATURATION: 98 % | SYSTOLIC BLOOD PRESSURE: 154 MMHG | WEIGHT: 146.2 LBS | HEIGHT: 63 IN | HEART RATE: 86 BPM | DIASTOLIC BLOOD PRESSURE: 74 MMHG | BODY MASS INDEX: 25.91 KG/M2 | TEMPERATURE: 97.1 F

## 2025-04-24 DIAGNOSIS — M32.9 SYSTEMIC LUPUS ERYTHEMATOSUS, UNSPECIFIED SLE TYPE, UNSPECIFIED ORGAN INVOLVEMENT STATUS (MULTI): Primary | ICD-10-CM

## 2025-04-24 DIAGNOSIS — Z51.81 ENCOUNTER FOR MONITORING OF HYDROXYCHLOROQUINE THERAPY: ICD-10-CM

## 2025-04-24 DIAGNOSIS — Z79.899 ENCOUNTER FOR MONITORING OF HYDROXYCHLOROQUINE THERAPY: ICD-10-CM

## 2025-04-24 PROCEDURE — 1036F TOBACCO NON-USER: CPT | Performed by: INTERNAL MEDICINE

## 2025-04-24 PROCEDURE — 99214 OFFICE O/P EST MOD 30 MIN: CPT | Performed by: INTERNAL MEDICINE

## 2025-04-24 PROCEDURE — 3078F DIAST BP <80 MM HG: CPT | Performed by: INTERNAL MEDICINE

## 2025-04-24 PROCEDURE — 1160F RVW MEDS BY RX/DR IN RCRD: CPT | Performed by: INTERNAL MEDICINE

## 2025-04-24 PROCEDURE — 1123F ACP DISCUSS/DSCN MKR DOCD: CPT | Performed by: INTERNAL MEDICINE

## 2025-04-24 PROCEDURE — 1159F MED LIST DOCD IN RCRD: CPT | Performed by: INTERNAL MEDICINE

## 2025-04-24 PROCEDURE — 3077F SYST BP >= 140 MM HG: CPT | Performed by: INTERNAL MEDICINE

## 2025-04-24 ASSESSMENT — ENCOUNTER SYMPTOMS
MYALGIAS: 0
FEVER: 0
COUGH: 0
FATIGUE: 0
ARTHRALGIAS: 0
BACK PAIN: 0
ENDOCRINE NEGATIVE: 1
GASTROINTESTINAL NEGATIVE: 1
SHORTNESS OF BREATH: 0
WEAKNESS: 0
COLOR CHANGE: 0
PSYCHIATRIC NEGATIVE: 1
EYES NEGATIVE: 1
NUMBNESS: 0
JOINT SWELLING: 0

## 2025-04-24 NOTE — ASSESSMENT & PLAN NOTE
You are on chronic plaquenil.     Make sure you see your eye doctor yearly.  Last done Jan 2024--please schedule    Plaquenil is dosed based on your weight.   We will make sure your dose is below the maximum daily dose of 5mg/kg    Orders:    Follow Up In Rheumatology; Future

## 2025-04-24 NOTE — LETTER
2025     Laina Hurley MD  [address] [facility]  [address]  [facility] [id number]    Patient: Marline Cardoza   YOB: 1940   Date of Visit: 2025       Dear Dr. Laina Hurley MD:    Thank you for referring Marline Cardoza to me for evaluation. Below are my notes for this visit  If you have questions, please do not hesitate to call me. I look forward to following your patient along with you.       Sincerely,     Siena Roberts MD      CC: No Recipients  ______________________________________________________________________________________                                                    [facility] RHEUMATOLOGY     AND INTERNAL MEDICINE    RHEUMATOLOGY PROGRESS NOTE    Marline Cardoza 84 y.o. female  :  1940      Chief Complaint   Patient presents with   • Lupus       SUBJECTIVE  Pt reports she is feeling well.   Is providing a lot of care for her  who is now blind  No worsening joint pain  +rashes on skin  No new symptoms.  No side effects with medication      Records since last seen reviewed in [facility], [facility] and [facility] Record  Patient Active Problem List    Diagnosis Date Noted   • Glaucoma suspect of both eyes 12/15/2023   • Hypertension 12/15/2023   • Systemic lupus erythematosus (Multi) 12/15/2023   • Encounter for monitoring of hydroxychloroquine therapy 12/15/2023     Past Medical History:   Diagnosis Date   • Episcleritis 2016    History of episcleritis   • Skin cancer 2020    History of Mohs micrographic surgery for skin cancer   • Unspecified cataract     Cataracts, bilateral   • Unspecified open wound of right cheek and temporomandibular area, initial encounter 2020    Open wound of right cheek     Current Outpatient Medications on File Prior to Visit   Medication Sig Dispense Refill   • atenolol (Tenormin) 50 mg tablet Take 1 tablet (50 mg) by mouth once daily.     • ten cit-mag-D3-Zn--man-bor (Citracal-D3 Plus  "Magnesium) 250- mg-mg-unit tablet Take by mouth.     • cholecalciferol (Vitamin D-3) 25 MCG (1000 UT) tablet Take 1 tablet (25 mcg) by mouth once daily.     • hydroCHLOROthiazide (HYDRODiuril) 25 mg tablet Take 0.5 tablets (12.5 mg) by mouth once daily.     • hydroxychloroquine (Plaquenil) 200 mg tablet TAKE 1 TABLET EVERY DAY 90 tablet 3   • latanoprost (Xalatan) 0.005 % ophthalmic solution Administer into affected eye(s).     • metFORMIN (Glucophage) 500 mg tablet      • multivitamin with minerals iron-free (Centrum Silver) Take 1 tablet by mouth once daily.     • Restasis 0.05 % ophthalmic emulsion Administer 1 drop into both eyes every 12 hours.       No current facility-administered medications on file prior to visit.     No Known Allergies  Social History     Tobacco Use   • Smoking status: Never   • Smokeless tobacco: Never   Substance Use Topics   • Alcohol use: Yes     Review of Systems   Constitutional:  Negative for fatigue and fever.   HENT: Negative.     Eyes: Negative.    Respiratory:  Negative for cough and shortness of breath.    Cardiovascular:  Negative for leg swelling.   Gastrointestinal: Negative.    Endocrine: Negative.    Genitourinary: Negative.    Musculoskeletal:  Negative for arthralgias, back pain, gait problem, joint swelling and myalgias.   Skin:  Positive for rash. Negative for color change.   Neurological:  Negative for weakness and numbness.   Psychiatric/Behavioral: Negative.         PHYSICAL EXAM  /74   Pulse 86   Temp 36.2 °C (97.1 °F)   Ht 1.6 m (5' 3\")   Wt 66.3 kg (146 lb 3.2 oz)   SpO2 98%   BMI 25.90 kg/m²   Depression: Not at risk (4/24/2025)    PHQ-2    • PHQ-2 Score: 0     Physical Exam  Vitals reviewed.   Constitutional:       General: She is not in acute distress.     Appearance: Normal appearance.   HENT:      Head: Normocephalic and atraumatic.   Eyes:      General: No scleral icterus.     Extraocular Movements: Extraocular movements intact.      " Conjunctiva/sclera: Conjunctivae normal.      Pupils: Pupils are equal, round, and reactive to light.   Cardiovascular:      Rate and Rhythm: Normal rate and regular rhythm.      Pulses: Normal pulses.      Heart sounds: Normal heart sounds. No murmur heard.     No friction rub. No gallop.   Pulmonary:      Effort: Pulmonary effort is normal. No respiratory distress.      Breath sounds: Normal breath sounds.   Musculoskeletal:         General: Deformity present. No swelling or tenderness.      Cervical back: Normal range of motion and neck supple. No tenderness.      Right lower leg: No edema.      Left lower leg: No edema.      Comments: No synovitis of examined joints  Angulation deformities of hands   Skin:     Coloration: Skin is not pale.      Findings: Rash (on nose and patches on arms and malar rash also present) present. No erythema.      Comments: Multiple moles   Neurological:      General: No focal deficit present.      Mental Status: She is alert and oriented to person, place, and time. Mental status is at baseline.      Gait: Gait normal.   Psychiatric:         Mood and Affect: Mood normal.           Health Maintenance Due   Topic Date Due   • Lipid Panel  Never done   • Medicare Annual Wellness Visit (AWV)  Never done   • Bone Density Scan  Never done   • Diabetes Screening  Never done   • RSV High Risk: (Elderly (60+) or Pregnant Population) (1 - 1-dose 75+ series) Never done   • COVID-19 Vaccine (5 - 2024-25 season) 09/01/2024       Assessment/plan  Assessment & Plan  Systemic lupus erythematosus, unspecified SLE type, unspecified organ involvement status (Multi)  On plaquenil therapy and largely in control   Pt to continue meds  Labs ordered today to monitor for increased disease activity, end organ manifestations and to monitor for any drug toxicities due to chronic medications    Orders:  •  Anti-DNA Antibody, Double-Stranded; Future  •  C3 Complement; Future  •  C4 Complement; Future  •  CBC and  Auto Differential; Future  •  Comprehensive Metabolic Panel; Future  •  C-Reactive Protein; Future  •  Sedimentation Rate; Future  •  Follow Up In Rheumatology; Future    Encounter for monitoring of hydroxychloroquine therapy  You are on chronic plaquenil.     Make sure you see your eye doctor yearly.  Last done Jan 2024--please schedule    Plaquenil is dosed based on your weight.   We will make sure your dose is below the maximum daily dose of 5mg/kg    Orders:  •  Follow Up In Rheumatology; Future      Follow up: __6___months, sooner if change in symptoms    Immunization History   Administered Date(s) Administered   • Flu vaccine, quadrivalent, high-dose, preservative free, age 65y+ (FLUZONE) 09/20/2019, 10/08/2021, 10/13/2021, 09/28/2022, 10/07/2023   • Flu vaccine, trivalent, preservative free, HIGH-DOSE, age 65y+ (Fluzone) 09/28/2016, 09/26/2017, 10/20/2018, 09/20/2019, 10/02/2020   • Flu vaccine, trivalent, preservative free, age 6 months and greater (Fluarix/Fluzone/Flulaval) 09/17/2020   • Influenza, seasonal, injectable 09/20/2013, 10/06/2014, 10/16/2015, 09/20/2016   • Moderna SARS-CoV-2 Vaccination 03/01/2021, 03/30/2021, 11/09/2021, 05/12/2022   • Pneumococcal conjugate vaccine, 13-valent (PREVNAR 13) 10/16/2015   • Pneumococcal conjugate vaccine, 20-valent (PREVNAR 20) 07/24/2023   • Pneumococcal polysaccharide vaccine, 23-valent, age 2 years and older (PNEUMOVAX 23) 10/06/2014   • Tdap vaccine, age 7 year and older (BOOSTRIX, ADACEL) 10/17/2016   • Zoster vaccine, recombinant, adult (SHINGRIX) 09/18/2018, 10/16/2018, 12/17/2018, 01/16/2019   • Zoster, live 02/26/2009

## 2025-04-24 NOTE — PATIENT INSTRUCTIONS

## 2025-04-24 NOTE — ASSESSMENT & PLAN NOTE
On plaquenil therapy and largely in control   Pt to continue meds  Labs ordered today to monitor for increased disease activity, end organ manifestations and to monitor for any drug toxicities due to chronic medications    Orders:    Anti-DNA Antibody, Double-Stranded; Future    C3 Complement; Future    C4 Complement; Future    CBC and Auto Differential; Future    Comprehensive Metabolic Panel; Future    C-Reactive Protein; Future    Sedimentation Rate; Future    Follow Up In Rheumatology; Future

## 2025-04-24 NOTE — PROGRESS NOTES
[facility] RHEUMATOLOGY     AND INTERNAL MEDICINE    RHEUMATOLOGY PROGRESS NOTE    Marline Cardoza 84 y.o. female  :  1940      Chief Complaint   Patient presents with    Lupus       SUBJECTIVE  Pt reports she is feeling well.   Is providing a lot of care for her  who is now blind  No worsening joint pain  +rashes on skin  No new symptoms.  No side effects with medication      Records since last seen reviewed in [facility], [facility] and Community Record  Patient Active Problem List    Diagnosis Date Noted    Glaucoma suspect of both eyes 12/15/2023    Hypertension 12/15/2023    Systemic lupus erythematosus (Multi) 12/15/2023    Encounter for monitoring of hydroxychloroquine therapy 12/15/2023     Past Medical History:   Diagnosis Date    Episcleritis 2016    History of episcleritis    Skin cancer 2020    History of Mohs micrographic surgery for skin cancer    Unspecified cataract     Cataracts, bilateral    Unspecified open wound of right cheek and temporomandibular area, initial encounter 2020    Open wound of right cheek     Current Outpatient Medications on File Prior to Visit   Medication Sig Dispense Refill    atenolol (Tenormin) 50 mg tablet Take 1 tablet (50 mg) by mouth once daily.      ten cit-mag-D3-Zn--man-bor (Citracal-D3 Plus Magnesium) 250- mg-mg-unit tablet Take by mouth.      cholecalciferol (Vitamin D-3) 25 MCG (1000 UT) tablet Take 1 tablet (25 mcg) by mouth once daily.      hydroCHLOROthiazide (HYDRODiuril) 25 mg tablet Take 0.5 tablets (12.5 mg) by mouth once daily.      hydroxychloroquine (Plaquenil) 200 mg tablet TAKE 1 TABLET EVERY DAY 90 tablet 3    latanoprost (Xalatan) 0.005 % ophthalmic solution Administer into affected eye(s).      metFORMIN (Glucophage) 500 mg tablet       multivitamin with minerals iron-free (Centrum Silver) Take 1 tablet by mouth once daily.      Restasis 0.05 % ophthalmic  "emulsion Administer 1 drop into both eyes every 12 hours.       No current facility-administered medications on file prior to visit.     No Known Allergies  Social History     Tobacco Use    Smoking status: Never    Smokeless tobacco: Never   Substance Use Topics    Alcohol use: Yes     Review of Systems   Constitutional:  Negative for fatigue and fever.   HENT: Negative.     Eyes: Negative.    Respiratory:  Negative for cough and shortness of breath.    Cardiovascular:  Negative for leg swelling.   Gastrointestinal: Negative.    Endocrine: Negative.    Genitourinary: Negative.    Musculoskeletal:  Negative for arthralgias, back pain, gait problem, joint swelling and myalgias.   Skin:  Positive for rash. Negative for color change.   Neurological:  Negative for weakness and numbness.   Psychiatric/Behavioral: Negative.         PHYSICAL EXAM  /74   Pulse 86   Temp 36.2 °C (97.1 °F)   Ht 1.6 m (5' 3\")   Wt 66.3 kg (146 lb 3.2 oz)   SpO2 98%   BMI 25.90 kg/m²   Depression: Not at risk (4/24/2025)    PHQ-2     PHQ-2 Score: 0     Physical Exam  Vitals reviewed.   Constitutional:       General: She is not in acute distress.     Appearance: Normal appearance.   HENT:      Head: Normocephalic and atraumatic.   Eyes:      General: No scleral icterus.     Extraocular Movements: Extraocular movements intact.      Conjunctiva/sclera: Conjunctivae normal.      Pupils: Pupils are equal, round, and reactive to light.   Cardiovascular:      Rate and Rhythm: Normal rate and regular rhythm.      Pulses: Normal pulses.      Heart sounds: Normal heart sounds. No murmur heard.     No friction rub. No gallop.   Pulmonary:      Effort: Pulmonary effort is normal. No respiratory distress.      Breath sounds: Normal breath sounds.   Musculoskeletal:         General: Deformity present. No swelling or tenderness.      Cervical back: Normal range of motion and neck supple. No tenderness.      Right lower leg: No edema.      Left " lower leg: No edema.      Comments: No synovitis of examined joints  Angulation deformities of hands   Skin:     Coloration: Skin is not pale.      Findings: Rash (on nose and patches on arms and malar rash also present) present. No erythema.      Comments: Multiple moles   Neurological:      General: No focal deficit present.      Mental Status: She is alert and oriented to person, place, and time. Mental status is at baseline.      Gait: Gait normal.   Psychiatric:         Mood and Affect: Mood normal.           Health Maintenance Due   Topic Date Due    Lipid Panel  Never done    Medicare Annual Wellness Visit (AWV)  Never done    Bone Density Scan  Never done    Diabetes Screening  Never done    RSV High Risk: (Elderly (60+) or Pregnant Population) (1 - 1-dose 75+ series) Never done    COVID-19 Vaccine (5 - 2024-25 season) 09/01/2024       Assessment/plan  Assessment & Plan  Systemic lupus erythematosus, unspecified SLE type, unspecified organ involvement status (Multi)  On plaquenil therapy and largely in control   Pt to continue meds  Labs ordered today to monitor for increased disease activity, end organ manifestations and to monitor for any drug toxicities due to chronic medications    Orders:    Anti-DNA Antibody, Double-Stranded; Future    C3 Complement; Future    C4 Complement; Future    CBC and Auto Differential; Future    Comprehensive Metabolic Panel; Future    C-Reactive Protein; Future    Sedimentation Rate; Future    Follow Up In Rheumatology; Future    Encounter for monitoring of hydroxychloroquine therapy  You are on chronic plaquenil.     Make sure you see your eye doctor yearly.  Last done Jan 2024--please schedule    Plaquenil is dosed based on your weight.   We will make sure your dose is below the maximum daily dose of 5mg/kg    Orders:    Follow Up In Rheumatology; Future      Follow up: __6___months, sooner if change in symptoms    Immunization History   Administered Date(s) Administered     Flu vaccine, quadrivalent, high-dose, preservative free, age 65y+ (FLUZONE) 09/20/2019, 10/08/2021, 10/13/2021, 09/28/2022, 10/07/2023    Flu vaccine, trivalent, preservative free, HIGH-DOSE, age 65y+ (Fluzone) 09/28/2016, 09/26/2017, 10/20/2018, 09/20/2019, 10/02/2020    Flu vaccine, trivalent, preservative free, age 6 months and greater (Fluarix/Fluzone/Flulaval) 09/17/2020    Influenza, seasonal, injectable 09/20/2013, 10/06/2014, 10/16/2015, 09/20/2016    Moderna SARS-CoV-2 Vaccination 03/01/2021, 03/30/2021, 11/09/2021, 05/12/2022    Pneumococcal conjugate vaccine, 13-valent (PREVNAR 13) 10/16/2015    Pneumococcal conjugate vaccine, 20-valent (PREVNAR 20) 07/24/2023    Pneumococcal polysaccharide vaccine, 23-valent, age 2 years and older (PNEUMOVAX 23) 10/06/2014    Tdap vaccine, age 7 year and older (BOOSTRIX, ADACEL) 10/17/2016    Zoster vaccine, recombinant, adult (SHINGRIX) 09/18/2018, 10/16/2018, 12/17/2018, 01/16/2019    Zoster, live 02/26/2009

## 2025-04-26 LAB
ALBUMIN SERPL-MCNC: 4.3 G/DL (ref 3.6–5.1)
ALP SERPL-CCNC: 60 U/L (ref 37–153)
ALT SERPL-CCNC: 21 U/L (ref 6–29)
ANION GAP SERPL CALCULATED.4IONS-SCNC: 9 MMOL/L (CALC) (ref 7–17)
AST SERPL-CCNC: 22 U/L (ref 10–35)
BASOPHILS # BLD AUTO: 42 CELLS/UL (ref 0–200)
BASOPHILS NFR BLD AUTO: 0.8 %
BILIRUB SERPL-MCNC: 0.4 MG/DL (ref 0.2–1.2)
BUN SERPL-MCNC: 22 MG/DL (ref 7–25)
C3 SERPL-MCNC: 137 MG/DL
C4 SERPL-MCNC: 28 MG/DL
CALCIUM SERPL-MCNC: 8.6 MG/DL (ref 8.6–10.4)
CHLORIDE SERPL-SCNC: 105 MMOL/L (ref 98–110)
CO2 SERPL-SCNC: 29 MMOL/L (ref 20–32)
CREAT SERPL-MCNC: 0.95 MG/DL (ref 0.6–0.95)
CRP SERPL-MCNC: 5.8 MG/L
DSDNA AB SER-ACNC: 1 IU/ML
EGFRCR SERPLBLD CKD-EPI 2021: 60 ML/MIN/1.73M2
EOSINOPHIL # BLD AUTO: 80 CELLS/UL (ref 15–500)
EOSINOPHIL NFR BLD AUTO: 1.5 %
ERYTHROCYTE [DISTWIDTH] IN BLOOD BY AUTOMATED COUNT: 12.7 % (ref 11–15)
ERYTHROCYTE [SEDIMENTATION RATE] IN BLOOD BY WESTERGREN METHOD: 6 MM/H
GLUCOSE SERPL-MCNC: 174 MG/DL (ref 65–139)
HCT VFR BLD AUTO: 40.4 % (ref 35–45)
HGB BLD-MCNC: 13.3 G/DL (ref 11.7–15.5)
LYMPHOCYTES # BLD AUTO: 726 CELLS/UL (ref 850–3900)
LYMPHOCYTES NFR BLD AUTO: 13.7 %
MCH RBC QN AUTO: 30.2 PG (ref 27–33)
MCHC RBC AUTO-ENTMCNC: 32.9 G/DL (ref 32–36)
MCV RBC AUTO: 91.6 FL (ref 80–100)
MONOCYTES # BLD AUTO: 313 CELLS/UL (ref 200–950)
MONOCYTES NFR BLD AUTO: 5.9 %
NEUTROPHILS # BLD AUTO: 4139 CELLS/UL (ref 1500–7800)
NEUTROPHILS NFR BLD AUTO: 78.1 %
PLATELET # BLD AUTO: 200 THOUSAND/UL (ref 140–400)
PMV BLD REES-ECKER: 11.5 FL (ref 7.5–12.5)
POTASSIUM SERPL-SCNC: 3.9 MMOL/L (ref 3.5–5.3)
PROT SERPL-MCNC: 6.2 G/DL (ref 6.1–8.1)
RBC # BLD AUTO: 4.41 MILLION/UL (ref 3.8–5.1)
SODIUM SERPL-SCNC: 143 MMOL/L (ref 135–146)
WBC # BLD AUTO: 5.3 THOUSAND/UL (ref 3.8–10.8)

## 2025-05-02 DIAGNOSIS — M32.9 SYSTEMIC LUPUS ERYTHEMATOSUS, UNSPECIFIED SLE TYPE, UNSPECIFIED ORGAN INVOLVEMENT STATUS (MULTI): ICD-10-CM

## 2025-05-02 RX ORDER — HYDROXYCHLOROQUINE SULFATE 200 MG/1
TABLET, FILM COATED ORAL DAILY
Qty: 90 TABLET | Refills: 3 | Status: SHIPPED | OUTPATIENT
Start: 2025-05-02

## 2025-10-24 ENCOUNTER — APPOINTMENT (OUTPATIENT)
Dept: RHEUMATOLOGY | Facility: CLINIC | Age: 85
End: 2025-10-24
Payer: MEDICARE